# Patient Record
Sex: FEMALE | Race: WHITE
[De-identification: names, ages, dates, MRNs, and addresses within clinical notes are randomized per-mention and may not be internally consistent; named-entity substitution may affect disease eponyms.]

---

## 2019-04-05 ENCOUNTER — HOSPITAL ENCOUNTER (EMERGENCY)
Dept: HOSPITAL 80 - FED | Age: 19
Discharge: HOME | End: 2019-04-05
Payer: MEDICAID

## 2019-04-05 VITALS — DIASTOLIC BLOOD PRESSURE: 84 MMHG | SYSTOLIC BLOOD PRESSURE: 132 MMHG
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
## 2019-04-05 DIAGNOSIS — S93.402A: Primary | ICD-10-CM

## 2019-04-05 PROCEDURE — L4386 NON-PNEUM WALK BOOT PRE CST: HCPCS

## 2019-04-05 NOTE — EDPHY
H & P


Stated Complaint: L ankle pain


Time Seen by Provider: 04/05/19 22:12


HPI/ROS: 





HPI





CHIEF COMPLAINT:  Left ankle pain/swelling





HISTORY OF PRESENT ILLNESS:  18-year-old female presents emergency room with 

left ankle pain.  Patient states she tripped and missed a curb.  She rolled her 

foot inwards and now has pain and swelling to left lateral ankle.  No numbness 

or tingling.  Neurovascular intact with good distal pulse.  Good cap refill.  

This happened at 9:30 a.m. Tonight.  Denies any other areas of injury.  She did 

fall on her knees and has abrasions present denies significant pain.  Denies 

alcohol or drugs tonight.








Past Medical History:  Denies medical history





Past Surgical History:  Denies surgical history





Social History:  Denies





Family History:  Denies








ROS   


REVIEW OF SYSTEMS:


10 Systems were reviewed and negative with the exception of the elements 

mentioned in the history of present illness.








Exam   


Constitutional   triage nursing summary reviewed, vital signs reviewed, awake/

alert. 


Eyes   normal conjunctivae and sclera, EOMI, PERRLA. 


HENT   normal inspection, atraumatic, moist mucus membranes, no epistaxis, neck 

supple/ no meningismus, no raccoon eyes. 


Respiratory   clear to auscultation bilaterally, normal breath sounds, no 

respiratory distress, no wheezing. 


Cardiovascular   rate normal, regular rhythm, no murmur, no edema, distal 

pulses normal. 


Gastrointestinal   soft, non-tender, no rebound, no guarding, normal bowel 

sounds, no distension, no pulsatile mass. 


Genitourinary   no CVA tenderness. 


Musculoskeletal left lower extremity:  Good distal pulse, good cap refill, mild 

tender palpation over the left lateral ankle swelling noted, full range of 

motion left ankle.  Sensation intact.  No compartment syndrome.  Does not 

appear malaligned.  No laceration, no open wound.


 no midline vertebral tenderness, full range of motion, no calf swelling, no 

tenderness of extremities, no meningismus, good pulses, neurovascularly intact.


Skin   pink, warm, & dry, no rash, skin atraumatic. 


Neurologic   awake, alert and oriented x 3, AAOx3, moves all 4 extremities 

equally, motor intact, sensory intact, CN II-XII intact, normal cerebellar, 

normal vision, normal speech. 


Psychiatric   normal mood/affect. 


Heme/Lymph/Immune   no lymphadenopathy.





Differential Diagnosis:  Includes but is not limited to in a particular order 

left ankle sprain, left ankle contusion, fracture, ligamentous disruption, soft 

tissue injury





Medical Decision Making:  Plan for this patient x-ray left ankle, ice pack, anti

-inflammatory pain medicine and re-evaluate.





Re-evaluation:














Left ankle x-ray reviewed no evidence of acute fracture.  There is a bony 

lesion that needs follow-up with Orthopedics I discussed this with the patient.





Recommend the patient ice, anti-inflammatory pain medicine, crutches, walking 

boot.





Follow up with Orthopedics next week.





Recommend elevation.





Return emergency room if worsening pain swelling questions or concerns


Source: Patient





- Personal History


LMP (Females 10-55): 15-21 Days Ago


Current Tetanus Diphtheria and Acellular Pertussis (TDAP): Yes





- Medical/Surgical History


Hx Asthma: No


Hx Chronic Respiratory Disease: No


Hx Diabetes: No


Hx Cardiac Disease: No


Hx Renal Disease: No


Hx Cirrhosis: No


Hx Alcoholism: No


Hx HIV/AIDS: No


Hx Splenectomy or Spleen Trauma: No


Other PMH: ankle injury,





- Social History


Smoking Status: Never smoked


Constitutional: 


 Initial Vital Signs











Temperature (C)  36.5 C   04/05/19 22:04


 


Heart Rate  90   04/05/19 22:04


 


Respiratory Rate  20   04/05/19 22:04


 


Blood Pressure  136/106 H  04/05/19 22:04


 


O2 Sat (%)  96   04/05/19 22:04








 











O2 Delivery Mode               Room Air














Allergies/Adverse Reactions: 


 





No Known Allergies Allergy (Unverified 04/05/19 22:03)


 








Home Medications: 














 Medication  Instructions  Recorded


 


Adderall 10 MG (*)  04/05/19


 


PENICILLIN V POTASSIUM  04/05/19


 


Sertraline HCl  04/05/19














Departure





- Departure


Disposition: Home, Routine, Self-Care


Clinical Impression: 


Ankle sprain


Qualifiers:


 Encounter type: initial encounter Involved ligament of ankle: unspecified 

ligament Laterality: left Qualified Code(s): S93.402A - Sprain of unspecified 

ligament of left ankle, initial encounter





Condition: Good


Instructions:  Ankle Fracture (ED), Ankle Sprain (ED)


Additional Instructions: 


1. Ice.


2. Anti-inflammatory pain medicine


3. Follow up with Orthopedics


Referrals: 


Patient,NotPresent [Unknown] - As per Instructions


Yuriy Odom MD [Medical Doctor] - As per Instructions